# Patient Record
(demographics unavailable — no encounter records)

---

## 2017-01-30 NOTE — PD
HPI


Chief Complaint:  Cold / Flu Symptoms


Time Seen by Provider:  14:33


Travel History


International Travel<30 days:  No


Contact w/Intl Traveler<30days:  No


Traveled to known affect area:  No





History of Present Illness


HPI


Patient is a 29-year-old male with chief complaint of ENT/URI symptoms.  

Present for 2 days.  He reports nasal congestion, rhinorrhea, postnasal drip, 

sore dry throat, dry cough, myalgias and headaches for 2 days.  MAXIMUM 

TEMPERATURE 103.0 Fahrenheit.  Has responded to ibuprofen which he took 3-4 

hours prior to arrival.  He denies any abdominal pain, nausea or diarrhea but 

states he had some posttussive emesis last evening.  Denies chest pain, dyspnea 

and wheezing.  No history of asthma.  He denies neck pain and stiffness.  Did 

not receive influenza vaccine this year.  He is otherwise healthy and has no 

other complaints at this time.





[facility]


Past Medical History


Medical History:  Denies Significant Hx





Past Surgical History


Surgical History:  No Previous Surgery





Social History


Alcohol Use:  Yes (OCCASIONALLY)


Tobacco Use:  Yes (1 PPD)


Substance Use:  Yes





Allergies-Medications


(Allergen,Severity, Reaction):  


Coded Allergies:  


     No Known Allergies (Verified , 1/30/17)


Reported Meds & Prescriptions





Reported Meds & Active Scripts


Active


Tessalon Perles (Benzonatate) 100 Mg Cap 100-200 Mg PO TID PRN


Magic Mouthwash Pediatric/Adult Liq (Lidocaine/Diphenhydr/Alum/Mg/Simeth) 60 Ml 

Susp 5-10 Ml SWISH-SPIT ACHS PRN


     Please makes 40 mL each: 2% viscous lidocaine, liquid diphenhydramine


     and Maalox liquid








Review of Systems


Except as stated in HPI:  all other systems reviewed are Neg





Physical Exam


Narrative


GENERAL: Well-developed and well-nourished adult male in no acute distress.


SKIN: Warm and dry.  Good turgor without tenting.


HEAD: Normocephalic and atraumatic.


EYES: PERRL bilaterally, 5mm. EOMI bilaterally. No injection or icterus 

present. No proptosis. Lids without edema or erythema.


ENT: Bilateral ear canals are non-edematous/non-erythematous without otorrhea. 

Bilateral TMs have intact landmarks and without distortion, perforation, air-

fluid level or erythema. Nasal mucosa erythematous and edematous with scant 

yellow discharge, septum intact and midline. Buccal mucosa pink and moist. 

Oropharynx free of erythema, tonsillar hypertrophy, masses, swelling, asymmetry 

and exudates. Uvula midline and airway patent.


NECK: Supple, no meningeal signs. Trachea midline, no JVD. No cervical or 

facial lymphadenopathy.


CARDIOVASCULAR: Regular rate and rhythm without murmurs, rubs, clicks or 

gallops. Radial and posterior tibial pulses 2+ bilaterally. No pedal edema.


RESPIRATORY: Clear to auscultation bilaterally with symmetrical rise and fall, 

no distress or use of accessory muscles.


GASTROINTESTINAL: Non-tender, non-distended. Normal bowel sounds all 4 

quadrants. No masses or organomegaly present. 


MUSCULOSKELETAL: No gait disturbances. Patient freely moving all four 

extremities spontaneously. Extremities without clubbing, cyanosis, or edema. No 

obvious deformities. 


NEUROLOGIC: CN II-XII grossly intact. Awake and alert. Motor grossly within 

normal limits. Normal speech.


PSYCHIATRIC: Appropriate mood and affect; insight and judgment normal.





Data


Data


Last Documented VS





Vital Signs








  Date Time  Temp Pulse Resp B/P Pulse Ox O2 Delivery O2 Flow Rate FiO2


 


1/30/17 13:59 100.0 87 16 119/81 95   








Orders





 Acetaminophen (Tylenol) (1/30/17 14:30)








MDM


Medical Decision Making


Medical Screen Exam Complete:  Yes


Emergency Medical Condition:  Yes


Differential Diagnosis


Influenza versus viral syndrome versus bronchitis


Narrative Course


Patient is an otherwise healthy 29-year-old male with a 2 day history of 

symptoms and physical consistent with viral syndrome, likely influenza.  The 

patient is concerned over the cost of Tamiflu and cannot fill this so we 

deferred influenza testing.  He declines the Tamiflu prescription.  Patient was 

given a work note temperature Magic mouthwash and Tessalon Perles to help with 

his symptoms.  Temperature is 100.0 Fahrenheit here, given Tylenol to help aid 

in keeping his fever down.  He has evidence of good volume status on exam and 

is non-toxic-appearing.See discharge paperwork for further instructions.  The 

plan was discussed with the patient who acknowledged their understanding and 

agreement.  Reinforced the follow-up with primary care is critically important.

  Patient instructed on emergent conditions that should prompt return to ED.





Diagnosis





 Primary Impression:  


 Acute viral syndrome


Patient Instructions:  General Instructions, Viral Syndrome (ED)


Departure Forms:  Tests/Procedures, Work Release   Enter return to work date:  

Feb 2, 2017





***Additional Instructions:


Take medication as prescribed


OTC Mucinex, cough suppressants, and decongestants as needed


OTC Tylenol or Ibuprofen for fever and discomfort


Drink lots of fluid to help clear mucous/drainage and stay hydrated


Follow up with PCP in 2 days


Return to the ED for any acute worsening of symptoms


***Med/Other Pt SpecificInfo:  Prescription(s) given


Scripts


Benzonatate (Tessalon Perles)100 Mg [id number] Mg PO TID PRN (COUGH) #20 CAP


   Prov:Meagan Sanders MD         1/30/17 


Diphenhydramine-Lidocaine-Mag-Alum-Simeth Liq (Magic Mouthwash Pediatric/Adult 

Liq)60 Ml Susp5-10 Ml SWISH-SPIT ACHS PRN (SORE THROAT) #120 ML


   Please makes 40 mL each: 2% viscous lidocaine, liquid diphenhydramine


   and Maalox liquid


   Prov:Meagan Sanders MD         1/30/17


Disposition:  01 DISCHARGE HOME


Condition:  Stable








Buck Dickens III Jan 30, 2017 14:37